# Patient Record
Sex: FEMALE | Race: WHITE | Employment: PART TIME | URBAN - METROPOLITAN AREA
[De-identification: names, ages, dates, MRNs, and addresses within clinical notes are randomized per-mention and may not be internally consistent; named-entity substitution may affect disease eponyms.]

---

## 2019-02-26 ASSESSMENT — ENCOUNTER SYMPTOMS
COUGH: 0
DIARRHEA: 0
MYALGIAS: 0
JOINT SWELLING: 0
HEADACHES: 0
NERVOUS/ANXIOUS: 0
CHILLS: 0
SHORTNESS OF BREATH: 0
DYSURIA: 0
ARTHRALGIAS: 0
NAUSEA: 0
PALPITATIONS: 0
CONSTIPATION: 0
FEVER: 0
PARESTHESIAS: 0
SORE THROAT: 0
WEAKNESS: 0
HEARTBURN: 0
HEMATOCHEZIA: 0
BREAST MASS: 0
DIZZINESS: 0
HEMATURIA: 0
EYE PAIN: 0
FREQUENCY: 0
ABDOMINAL PAIN: 0

## 2019-02-28 ENCOUNTER — OFFICE VISIT (OUTPATIENT)
Dept: PEDIATRICS | Facility: CLINIC | Age: 24
End: 2019-02-28
Payer: COMMERCIAL

## 2019-02-28 VITALS
BODY MASS INDEX: 20.62 KG/M2 | WEIGHT: 128.3 LBS | TEMPERATURE: 98 F | OXYGEN SATURATION: 99 % | SYSTOLIC BLOOD PRESSURE: 104 MMHG | DIASTOLIC BLOOD PRESSURE: 64 MMHG | HEIGHT: 66 IN | HEART RATE: 82 BPM

## 2019-02-28 DIAGNOSIS — R00.2 PALPITATIONS: ICD-10-CM

## 2019-02-28 DIAGNOSIS — L70.9 ACNE, UNSPECIFIED ACNE TYPE: Primary | ICD-10-CM

## 2019-02-28 DIAGNOSIS — N92.6 IRREGULAR MENSES: ICD-10-CM

## 2019-02-28 DIAGNOSIS — Z00.00 HEALTH CARE MAINTENANCE: ICD-10-CM

## 2019-02-28 DIAGNOSIS — J31.0 CHRONIC RHINITIS: ICD-10-CM

## 2019-02-28 DIAGNOSIS — Z11.3 ROUTINE SCREENING FOR STI (SEXUALLY TRANSMITTED INFECTION): ICD-10-CM

## 2019-02-28 PROCEDURE — 87491 CHLMYD TRACH DNA AMP PROBE: CPT | Performed by: NURSE PRACTITIONER

## 2019-02-28 PROCEDURE — G0145 SCR C/V CYTO,THINLAYER,RESCR: HCPCS | Performed by: NURSE PRACTITIONER

## 2019-02-28 PROCEDURE — 99385 PREV VISIT NEW AGE 18-39: CPT | Performed by: NURSE PRACTITIONER

## 2019-02-28 PROCEDURE — 99213 OFFICE O/P EST LOW 20 MIN: CPT | Mod: 25 | Performed by: NURSE PRACTITIONER

## 2019-02-28 RX ORDER — NORGESTIMATE AND ETHINYL ESTRADIOL 0.25-0.035
1 KIT ORAL DAILY
Qty: 84 TABLET | Refills: 3 | Status: SHIPPED | OUTPATIENT
Start: 2019-02-28 | End: 2020-01-15

## 2019-02-28 ASSESSMENT — ENCOUNTER SYMPTOMS
WEAKNESS: 0
HEADACHES: 0
MYALGIAS: 0
PALPITATIONS: 0
FEVER: 0
CONSTIPATION: 0
NAUSEA: 0
HEMATOCHEZIA: 0
DIZZINESS: 0
SHORTNESS OF BREATH: 0
BREAST MASS: 0
HEMATURIA: 0
SORE THROAT: 0
ARTHRALGIAS: 0
CHILLS: 0
JOINT SWELLING: 0
NERVOUS/ANXIOUS: 0
DIARRHEA: 0
EYE PAIN: 0
DYSURIA: 0
FREQUENCY: 0
PARESTHESIAS: 0
COUGH: 0
HEARTBURN: 0
ABDOMINAL PAIN: 0

## 2019-02-28 ASSESSMENT — MIFFLIN-ST. JEOR: SCORE: 1352.68

## 2019-02-28 NOTE — PATIENT INSTRUCTIONS
1. Start sprintec birth control on Sunday  2. Call me if you want to do a heart monitor        Preventive Health Recommendations  Female Ages 21 to 25     Yearly exam:     See your health care provider every year in order to  o Review health changes.   o Discuss preventive care.    o Review your medicines if your doctor has prescribed any.      You should be tested each year for STDs (sexually transmitted diseases).       Talk to your provider about how often you should have cholesterol testing.      Get a Pap test every three years. If you have an abnormal result, your doctor may have you test more often.      If you are at risk for diabetes, you should have a diabetes test (fasting glucose).     Shots:     Get a flu shot each year.     Get a tetanus shot every 10 years.     Consider getting the shot (vaccine) that prevents cervical cancer (Gardasil).    Nutrition:     Eat at least 5 servings of fruits and vegetables each day.    Eat whole-grain bread, whole-wheat pasta and brown rice instead of white grains and rice.    Get adequate Calcium and Vitamin D.     Lifestyle    Exercise at least 150 minutes a week each week (30 minutes a day, 5 days a week). This will help you control your weight and prevent disease.    Limit alcohol to one drink per day.    No smoking.     Wear sunscreen to prevent skin cancer.    See your dentist every six months for an exam and cleaning.

## 2019-02-28 NOTE — PROGRESS NOTES
SUBJECTIVE:   CC: Christine Perez is an 23 year old woman who presents for preventive health visit.     Physical   Annual:     Getting at least 3 servings of Calcium per day:  Yes    Bi-annual eye exam:  Yes    Dental care twice a year:  NO    Sleep apnea or symptoms of sleep apnea:  None    Diet:  Regular (no restrictions)    Frequency of exercise:  1 day/week    Duration of exercise:  15-30 minutes    Taking medications regularly:  Yes    Medication side effects:  None    Additional concerns today:  Yes    PHQ-2 Total Score: 0    Concerns today: irregular menses. Vary in length between periods but cycle is always the same. Has heavy cramps. Flow is moderate    Wants to discuss birth control  Wants to discuss STD testing  PVCs    Seasonal allergies. Has had a mildly runny nose since October. Hasn't tried claritin.     Acne is worse around her menses. Also has back acne.        -------------------------------------    Today's PHQ-2 Score:   PHQ-2 ( 1999 Pfizer) 2/26/2019   Q1: Little interest or pleasure in doing things 0   Q2: Feeling down, depressed or hopeless 0   PHQ-2 Score 0   Q1: Little interest or pleasure in doing things Not at all   Q2: Feeling down, depressed or hopeless Not at all   PHQ-2 Score 0     Abuse: Current or Past(Physical, Sexual or Emotional)- No  Do you feel safe in your environment? Yes    Social History     Tobacco Use     Smoking status: Never Smoker     Smokeless tobacco: Never Used   Substance Use Topics     Alcohol use: Yes     Comment: 2 rimes per week     Alcohol Use 2/26/2019   If you drink alcohol do you typically have greater than 3 drinks per day OR greater than 7 drinks per week? No   No flowsheet data found.    Reviewed orders with patient.  Reviewed health maintenance and updated orders accordingly - Yes  Labs reviewed in EPIC    Mammogram not appropriate for this patient based on age.    Pertinent mammograms are reviewed under the imaging tab.  History of abnormal Pap  "smear: NO - age 21-29 PAP every 3 years recommended     Reviewed and updated as needed this visit by clinical staff  Tobacco  Allergies  Meds  Med Hx  Surg Hx  Fam Hx  Soc Hx        Reviewed and updated as needed this visit by Provider            Review of Systems   Constitutional: Negative for chills and fever.   HENT: Positive for congestion. Negative for ear pain, hearing loss and sore throat.    Eyes: Negative for pain and visual disturbance.   Respiratory: Negative for cough and shortness of breath.    Cardiovascular: Negative for chest pain, palpitations and peripheral edema.   Gastrointestinal: Negative for abdominal pain, constipation, diarrhea, heartburn, hematochezia and nausea.   Breasts:  Negative for tenderness, breast mass and discharge.   Genitourinary: Negative for dysuria, frequency, genital sores, hematuria, pelvic pain, urgency, vaginal bleeding and vaginal discharge.   Musculoskeletal: Negative for arthralgias, joint swelling and myalgias.   Skin: Positive for rash.   Neurological: Negative for dizziness, weakness, headaches and paresthesias.   Psychiatric/Behavioral: Negative for mood changes. The patient is not nervous/anxious.         OBJECTIVE:   /64 (BP Location: Right arm, Cuff Size: Adult Regular)   Pulse 82   Temp 98  F (36.7  C) (Tympanic)   Ht 1.683 m (5' 6.25\")   Wt 58.2 kg (128 lb 4.8 oz)   LMP 01/28/2019 (Exact Date)   SpO2 99%   BMI 20.55 kg/m    Physical Exam  GENERAL: healthy, alert and no distress  EYES: Eyes grossly normal to inspection, PERRL and conjunctivae and sclerae normal  HENT: ear canals and TM's normal, nose and mouth without ulcers or lesions  NECK: no adenopathy, no asymmetry, masses, or scars and thyroid normal to palpation  RESP: lungs clear to auscultation - no rales, rhonchi or wheezes  CV: regular rate and rhythm, normal S1 S2, no S3 or S4, no murmur, click or rub, no peripheral edema and peripheral pulses strong  ABDOMEN: soft, nontender, no " hepatosplenomegaly, no masses and bowel sounds normal   (female): normal female external genitalia, normal urethral meatus, vaginal mucosa pink, moist, well rugated, and normal cervix/adnexa/uterus without masses or discharge  MS: no gross musculoskeletal defects noted, no edema  SKIN: no suspicious lesions or rashes  NEURO: Normal strength and tone, mentation intact and speech normal  PSYCH: mentation appears normal, affect normal/bright    Diagnostic Test Results:  none     ASSESSMENT/PLAN:   3. Health care maintenance  - PAP imaged thin layer screen only - recommended age 21 - 24 years    1. Acne, unspecified acne type  Cyclical. Will likely benefit from OCP. No contraindications.   -Also has derm appt.   - norgestimate-ethinyl estradiol (ORTHO-CYCLEN/SPRINTEC) 0.25-35 MG-MCG tablet; Take 1 tablet by mouth daily  Dispense: 84 tablet; Refill: 3    2. Irregular menses  No contraindications. Wishing to regulate cycle.   - norgestimate-ethinyl estradiol (ORTHO-CYCLEN/SPRINTEC) 0.25-35 MG-MCG tablet; Take 1 tablet by mouth daily  Dispense: 84 tablet; Refill: 3  -Discussed r/b/se    4. Routine screening for STI (sexually transmitted infection)  - CHLAMYDIA TRACHOMATIS PCR    5. Chronic rhinitis  Ongoing since October. No sinusitis sx necessarily. Likely allergic rhinitis  -Recommended trial of Claritin daily. Return PRN.    (R00.2) Palpitations  Comment: Patient reports about once every 2 weeks having episodes of palpitations. Sometimes she has a singular palpitation, sometimes they last a few minutes and can be associated with vision changes but not with shortness of breath, weakness, or presyncope  Plan:   -Discussed various etiologies, some of which are life threatening. She wishes to hold on holter monitor. Will call me if she changes her mind. She is also to call me if increasing in frequency or severity of associated sx. Discussed reasons to seek care urgently.         COUNSELING:  Reviewed preventive health  "counseling, as reflected in patient instructions    BP Readings from Last 1 Encounters:   02/28/19 104/64     Estimated body mass index is 20.55 kg/m  as calculated from the following:    Height as of this encounter: 1.683 m (5' 6.25\").    Weight as of this encounter: 58.2 kg (128 lb 4.8 oz).           reports that  has never smoked. she has never used smokeless tobacco.      Counseling Resources:  ATP IV Guidelines  Pooled Cohorts Equation Calculator  Breast Cancer Risk Calculator  FRAX Risk Assessment  ICSI Preventive Guidelines  Dietary Guidelines for Americans, 2010  USDA's MyPlate  ASA Prophylaxis  Lung CA Screening    Peyton Baxter, DAVONTE [facility] [place]  "

## 2019-03-01 LAB
C TRACH DNA SPEC QL NAA+PROBE: NEGATIVE
SPECIMEN SOURCE: NORMAL

## 2019-03-04 LAB
COPATH REPORT: NORMAL
PAP: NORMAL

## 2019-08-08 ENCOUNTER — MYC REFILL (OUTPATIENT)
Dept: PEDIATRICS | Facility: CLINIC | Age: 24
End: 2019-08-08

## 2019-08-08 DIAGNOSIS — N92.6 IRREGULAR MENSES: ICD-10-CM

## 2019-08-08 DIAGNOSIS — L70.9 ACNE, UNSPECIFIED ACNE TYPE: ICD-10-CM

## 2019-08-08 NOTE — TELEPHONE ENCOUNTER
"Requested Prescriptions   Pending Prescriptions Disp Refills     norgestimate-ethinyl estradiol (ORTHO-CYCLEN/SPRINTEC) 0.25-35 MG-MCG tablet  Last Written Prescription Date:  02/28/2019  Last Fill Quantity: 84 tablet,  # refills: 3   Last Office Visit: 2/28/2019 Peyton Baxter APRN CNP  Future Office Visit:      84 tablet 3     Sig: Take 1 tablet by mouth daily       Contraceptives Protocol Passed - 8/8/2019  3:35 PM        Passed - Patient is not a current smoker if age is 35 or older        Passed - Recent (12 mo) or future (30 days) visit within the authorizing provider's specialty     Patient had office visit in the last 12 months or has a visit in the next 30 days with authorizing provider or within the authorizing provider's specialty.  See \"Patient Info\" tab in inbasket, or \"Choose Columns\" in Meds & Orders section of the refill encounter.              Passed - Medication is active on med list        Passed - No active pregnancy on record        Passed - No positive pregnancy test in past 12 months          "

## 2019-08-09 RX ORDER — NORGESTIMATE AND ETHINYL ESTRADIOL 0.25-0.035
1 KIT ORAL DAILY
Qty: 84 TABLET | Refills: 3 | OUTPATIENT
Start: 2019-08-09

## 2019-09-01 ENCOUNTER — MYC REFILL (OUTPATIENT)
Dept: PEDIATRICS | Facility: CLINIC | Age: 24
End: 2019-09-01

## 2019-09-01 DIAGNOSIS — L70.9 ACNE, UNSPECIFIED ACNE TYPE: ICD-10-CM

## 2019-09-01 DIAGNOSIS — N92.6 IRREGULAR MENSES: ICD-10-CM

## 2019-09-01 RX ORDER — NORGESTIMATE AND ETHINYL ESTRADIOL 0.25-0.035
1 KIT ORAL DAILY
Qty: 84 TABLET | Refills: 3 | Status: CANCELLED | OUTPATIENT
Start: 2019-09-01

## 2019-09-01 NOTE — TELEPHONE ENCOUNTER
"Requested Prescriptions   Pending Prescriptions Disp Refills     norgestimate-ethinyl estradiol (ORTHO-CYCLEN/SPRINTEC) 0.25-35 MG-MCG tablet 84 tablet 3     Sig: Take 1 tablet by mouth daily  Last Written Prescription Date:  02/28/2019  Last Fill Quantity: 84 tablet,  # refills: 3   Last office visit: 2/28/2019 with prescribing provider:  Peyton Baxter APRN CNP    Future Office Visit:           Contraceptives Protocol Passed - 9/1/2019  8:09 AM        Passed - Patient is not a current smoker if age is 35 or older        Passed - Recent (12 mo) or future (30 days) visit within the authorizing provider's specialty     Patient had office visit in the last 12 months or has a visit in the next 30 days with authorizing provider or within the authorizing provider's specialty.  See \"Patient Info\" tab in inbasket, or \"Choose Columns\" in Meds & Orders section of the refill encounter.              Passed - Medication is active on med list        Passed - No active pregnancy on record        Passed - No positive pregnancy test in past 12 months          "

## 2019-09-03 ENCOUNTER — MYC REFILL (OUTPATIENT)
Dept: PEDIATRICS | Facility: CLINIC | Age: 24
End: 2019-09-03

## 2019-09-03 DIAGNOSIS — L70.9 ACNE, UNSPECIFIED ACNE TYPE: ICD-10-CM

## 2019-09-03 DIAGNOSIS — N92.6 IRREGULAR MENSES: ICD-10-CM

## 2019-09-04 RX ORDER — NORGESTIMATE AND ETHINYL ESTRADIOL 0.25-0.035
1 KIT ORAL DAILY
Qty: 84 TABLET | Refills: 3 | OUTPATIENT
Start: 2019-09-04

## 2019-09-04 NOTE — TELEPHONE ENCOUNTER
"Requested Prescriptions   Pending Prescriptions Disp Refills     norgestimate-ethinyl estradiol (ORTHO-CYCLEN/SPRINTEC) 0.25-35 MG-MCG tablet 84 tablet 3     Sig: Take 1 tablet by mouth daily       Contraceptives Protocol Passed - 9/3/2019  9:58 AM        Passed - Patient is not a current smoker if age is 35 or older        Passed - Recent (12 mo) or future (30 days) visit within the authorizing provider's specialty     Patient had office visit in the last 12 months or has a visit in the next 30 days with authorizing provider or within the authorizing provider's specialty.  See \"Patient Info\" tab in inbasket, or \"Choose Columns\" in Meds & Orders section of the refill encounter.              Passed - Medication is active on med list        Passed - No active pregnancy on record        Passed - No positive pregnancy test in past 12 months        RX refused, patient has refills at pharmacy.  "

## 2019-10-10 ENCOUNTER — TELEPHONE (OUTPATIENT)
Dept: PEDIATRICS | Facility: CLINIC | Age: 24
End: 2019-10-10

## 2019-10-10 NOTE — TELEPHONE ENCOUNTER
Reason for call:  Symptom   Symptom or request: Uti sx's    Duration (how long have symptoms been present): x3days  Have you been treated for this before? Yes    Additional comments: Pt req rx be sent to pharmacy, she is having UTI sx's-Itchiness, Frequent urination, pain/burning while urinating, Blood at the end of the urine stream. 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 Req be sent to [place] [address] [address] #-[phone],   Pt mentioned she has taken Bactrim before for UTI's and has worked great so prefers to be prescribed Bactrim.    Phone number to reach patient:  Cell number on file:    Telephone Information:   Mobile [phone]       Best Time:  Any    Can we leave a detailed message on this number?  YES

## 2019-10-11 NOTE — TELEPHONE ENCOUNTER
Spoke to patient and explained we need to set her up with either a same day appt or she will need to be seen in urgent care. Patient is going to see if she would be able to come in today and is going to call us back.    MA/TC: Please assist patient on scheduling a Same Day appointment when she calls back or advise her to go to urgent care. Thanks!  Jillian PALACIOS RN, BSN

## 2019-10-14 NOTE — TELEPHONE ENCOUNTER
Called patient for update. Patient reports she never did make an appointment or go in for evaluation but reports her symptoms have subsided so she no longer needs an appointment.     Advised if her symptoms return she can submit an evisit on [employer] for UTI symptoms as well. Patient verbalized understanding.

## 2019-11-13 ENCOUNTER — TELEPHONE (OUTPATIENT)
Dept: PEDIATRICS | Facility: CLINIC | Age: 24
End: 2019-11-13

## 2019-11-13 DIAGNOSIS — Z00.00 HEALTH CARE MAINTENANCE: Primary | ICD-10-CM

## 2019-11-13 NOTE — TELEPHONE ENCOUNTER
Patient is requesting titers for Nursing school. She needs Hepatitis C, Varicella and MMR titers drawn. Pended labs. Please review/sign if appropriate.     Please transfer to MA/TC to help schedule lab only appointment once approved.  Thanks!  Jillian PALACIOS RN, BSN

## 2019-11-13 NOTE — TELEPHONE ENCOUNTER
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 Thank you.     Phone Number Patient can be reached at: Home number on file [phone] (home)    Best Time: Anytime    Can we leave a detailed message on this number? YES    Call taken on 11/13/2019 at 10:55 AM by Kathy Boo

## 2019-11-18 ENCOUNTER — MYC REFILL (OUTPATIENT)
Dept: PEDIATRICS | Facility: CLINIC | Age: 24
End: 2019-11-18

## 2019-11-18 DIAGNOSIS — L70.9 ACNE, UNSPECIFIED ACNE TYPE: ICD-10-CM

## 2019-11-18 DIAGNOSIS — N92.6 IRREGULAR MENSES: ICD-10-CM

## 2019-11-18 RX ORDER — NORGESTIMATE AND ETHINYL ESTRADIOL 0.25-0.035
1 KIT ORAL DAILY
Qty: 84 TABLET | Refills: 3 | Status: CANCELLED | OUTPATIENT
Start: 2019-11-18

## 2019-11-18 NOTE — TELEPHONE ENCOUNTER
"Requested Prescriptions   Pending Prescriptions Disp Refills     norgestimate-ethinyl estradiol (ORTHO-CYCLEN/SPRINTEC) 0.25-35 MG-MCG tablet  Last Written Prescription Date:  02/28/2019  Last Fill Quantity: 84 tablet,  # refills: 3   Last Office Visit: 2/28/2019 Peyton Baxter APRN CNP   Future Office Visit:      84 tablet 3     Sig: Take 1 tablet by mouth daily       Contraceptives Protocol Passed - 11/18/2019  2:44 PM        Passed - Patient is not a current smoker if age is 35 or older        Passed - Recent (12 mo) or future (30 days) visit within the authorizing provider's specialty     Patient has had an office visit with the authorizing provider or a provider within the authorizing providers department within the previous 12 mos or has a future within next 30 days. See \"Patient Info\" tab in inbasket, or \"Choose Columns\" in Meds & Orders section of the refill encounter.              Passed - Medication is active on med list        Passed - No active pregnancy on record        Passed - No positive pregnancy test in past 12 months          "

## 2019-12-06 NOTE — TELEPHONE ENCOUNTER
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    Please, call pt when orders are placed for lab scheduling.     Pt preferred phone number on file and it is OK to leave a detailed message.

## 2019-12-06 NOTE — TELEPHONE ENCOUNTER
Called patient- no answer. Lvm to call clinic to schedule an appointment.     Roseann Burrows MA

## 2019-12-12 DIAGNOSIS — Z00.00 HEALTH CARE MAINTENANCE: ICD-10-CM

## 2019-12-12 LAB — HCV AB SERPL QL IA: NONREACTIVE

## 2019-12-12 PROCEDURE — 86762 RUBELLA ANTIBODY: CPT | Performed by: NURSE PRACTITIONER

## 2019-12-12 PROCEDURE — 86735 MUMPS ANTIBODY: CPT | Performed by: NURSE PRACTITIONER

## 2019-12-12 PROCEDURE — 86765 RUBEOLA ANTIBODY: CPT | Performed by: NURSE PRACTITIONER

## 2019-12-12 PROCEDURE — 36415 COLL VENOUS BLD VENIPUNCTURE: CPT | Performed by: NURSE PRACTITIONER

## 2019-12-12 PROCEDURE — 86787 VARICELLA-ZOSTER ANTIBODY: CPT | Performed by: NURSE PRACTITIONER

## 2019-12-13 LAB
MEV IGG SER QL IA: 0.8 AI (ref 0–0.8)
MUV IGG SER QL IA: 1.3 AI (ref 0–0.8)
RUBV IGG SERPL IA-ACNC: 16 IU/ML
VZV IGG SER QL IA: 0.8 AI (ref 0–0.8)

## 2019-12-16 DIAGNOSIS — Z11.59 NEED FOR HEPATITIS B SCREENING TEST: ICD-10-CM

## 2019-12-16 PROCEDURE — 86704 HEP B CORE ANTIBODY TOTAL: CPT | Performed by: NURSE PRACTITIONER

## 2019-12-16 PROCEDURE — 36415 COLL VENOUS BLD VENIPUNCTURE: CPT | Performed by: NURSE PRACTITIONER

## 2019-12-16 PROCEDURE — 87340 HEPATITIS B SURFACE AG IA: CPT | Performed by: NURSE PRACTITIONER

## 2019-12-16 PROCEDURE — 86706 HEP B SURFACE ANTIBODY: CPT | Performed by: NURSE PRACTITIONER

## 2019-12-17 LAB
HBV CORE AB SERPL QL IA: NONREACTIVE
HBV SURFACE AB SERPL IA-ACNC: NORMAL M[IU]/ML
HBV SURFACE AG SERPL QL IA: NONREACTIVE

## 2020-01-15 ENCOUNTER — MYC REFILL (OUTPATIENT)
Dept: PEDIATRICS | Facility: CLINIC | Age: 25
End: 2020-01-15

## 2020-01-15 DIAGNOSIS — N92.6 IRREGULAR MENSES: ICD-10-CM

## 2020-01-15 DIAGNOSIS — L70.9 ACNE, UNSPECIFIED ACNE TYPE: ICD-10-CM

## 2020-01-16 RX ORDER — NORGESTIMATE AND ETHINYL ESTRADIOL 0.25-0.035
1 KIT ORAL DAILY
Qty: 84 TABLET | Refills: 0 | Status: SHIPPED | OUTPATIENT
Start: 2020-01-16 | End: 2020-04-12

## 2020-01-16 NOTE — TELEPHONE ENCOUNTER
"Prescription approved per [facility], [facility] or [facility] refill protocol.  Edilia ISRAEL - Registered Nurse  [facility]  Acute and Diagnostic Services        Requested Prescriptions   Pending Prescriptions Disp Refills     norgestimate-ethinyl estradiol (ORTHO-CYCLEN/SPRINTEC) 0.25-35 MG-MCG tablet 84 tablet 3     Sig: Take 1 tablet by mouth daily       Contraceptives Protocol Passed - 1/15/2020 11:32 AM        Passed - Patient is not a current smoker if age is 35 or older        Passed - Recent (12 mo) or future (30 days) visit within the authorizing provider's specialty     Patient has had an office visit with the authorizing provider or a provider within the authorizing providers department within the previous 12 mos or has a future within next 30 days. See \"Patient Info\" tab in inbasket, or \"Choose Columns\" in Meds & Orders section of the refill encounter.              Passed - Medication is active on med list        Passed - No active pregnancy on record        Passed - No positive pregnancy test in past 12 months          "

## 2020-04-12 ENCOUNTER — MYC REFILL (OUTPATIENT)
Dept: PEDIATRICS | Facility: CLINIC | Age: 25
End: 2020-04-12

## 2020-04-12 DIAGNOSIS — L70.9 ACNE, UNSPECIFIED ACNE TYPE: ICD-10-CM

## 2020-04-12 DIAGNOSIS — N92.6 IRREGULAR MENSES: ICD-10-CM

## 2020-04-13 RX ORDER — NORGESTIMATE AND ETHINYL ESTRADIOL 0.25-0.035
1 KIT ORAL DAILY
Qty: 84 TABLET | Refills: 0 | Status: SHIPPED | OUTPATIENT
Start: 2020-04-13 | End: 2020-06-18

## 2020-04-13 NOTE — TELEPHONE ENCOUNTER
LOV was on 2/28/19. Please review the notes from TC below & advise.    Irene, RN  Triage Nurse

## 2020-04-13 NOTE — TELEPHONE ENCOUNTER
"norgestimate-ethinyl estradiol (ORTHO-CYCLEN) 0.25-35 MG-MCG tablet      Last Written Prescription Date:  01/16/20  Last Fill Quantity: 84 tabs,   # refills: 0  Last Office Visit: 02/28/19  Future Office visit:    Next 5 appointments (look out 90 days)    Jun 18, 2020  3:35 PM CDT  (Arrive by 3:10 PM)  Adult [facility]ative Visit with DAV[facility] C[facility] [address] [place] [id number] [phone]           Routing refill request to provider for review/approval because:  Failed - Recent (12 mo) or future (30 days) visit within the authorizing provider's specialty        - Reilly \"Magdiel\" Blake RN - Patient Advocate Liason (PAL)  [facility]    "

## 2020-04-13 NOTE — TELEPHONE ENCOUNTER
Hi there,    Please let her know that there is a charge for all office visits other than a physical as long as there aren't any issues. Would she be willing to come in for a physical? If so, we can schedule her for August and fill it until then. If not, then I cannot fill.

## 2020-04-13 NOTE — TELEPHONE ENCOUNTER
Called and LVM to contact clinic re: refill request.  Please schedule for physical in August and send back to provider with appt info for refill.  See below.  Wendie Buchanan, CMA

## 2020-04-13 NOTE — TELEPHONE ENCOUNTER
Patient called back she stated she will only do a video visit or my chart or telephone if there is no charge I explain to her that there are charges for all of those at this time, she said that she would rather go with out her birthcontrol then. Please review this and see what the provider wants to do.

## 2020-06-15 ASSESSMENT — ENCOUNTER SYMPTOMS
EYE PAIN: 0
HEMATOCHEZIA: 0
DIARRHEA: 0
WEAKNESS: 0
SHORTNESS OF BREATH: 0
CHILLS: 0
FREQUENCY: 0
DYSURIA: 0
HEMATURIA: 0
DIZZINESS: 0
BREAST MASS: 0
HEADACHES: 0
PALPITATIONS: 0
FEVER: 0
PARESTHESIAS: 0
HEARTBURN: 0
COUGH: 0
ARTHRALGIAS: 0
JOINT SWELLING: 0
NERVOUS/ANXIOUS: 0
NAUSEA: 0
SORE THROAT: 0
ABDOMINAL PAIN: 0
CONSTIPATION: 0
MYALGIAS: 0

## 2020-06-18 ENCOUNTER — VIRTUAL VISIT (OUTPATIENT)
Dept: PEDIATRICS | Facility: CLINIC | Age: 25
End: 2020-06-18
Payer: COMMERCIAL

## 2020-06-18 DIAGNOSIS — N92.6 IRREGULAR MENSES: ICD-10-CM

## 2020-06-18 DIAGNOSIS — L70.9 ACNE, UNSPECIFIED ACNE TYPE: ICD-10-CM

## 2020-06-18 DIAGNOSIS — Z86.69 HISTORY OF CHIARI MALFORMATION: ICD-10-CM

## 2020-06-18 PROCEDURE — 99213 OFFICE O/P EST LOW 20 MIN: CPT | Mod: 95 | Performed by: NURSE PRACTITIONER

## 2020-06-18 RX ORDER — NORGESTIMATE AND ETHINYL ESTRADIOL 0.25-0.035
1 KIT ORAL DAILY
Qty: 84 TABLET | Refills: 4 | Status: SHIPPED | OUTPATIENT
Start: 2020-06-18 | End: 2021-07-15

## 2020-06-18 NOTE — PROGRESS NOTES
"Christine Perez is a 24 year old female who is being evaluated via a billable video visit.      The patient has been notified of following:     \"This video visit will be conducted via a call between you and your physician/provider. We have found that certain health care needs can be provided without the need for an in-person physical exam.  This service lets us provide the care you need with a video conversation.  If a prescription is necessary we can send it directly to your pharmacy.  If lab work is needed we can place an order for that and you can then stop by our lab to have the test done at a later time.    Video visits are billed at different rates depending on your insurance coverage.  Please reach out to your insurance provider with any questions.    If during the course of the call the physician/provider feels a video visit is not appropriate, you will not be charged for this service.\"    Patient has given verbal consent for Video visit? Yes    Will anyone else be joining your video visit? No    Subjective     Christine Perez is a 24 year old female who presents today via video visit for the following health issues:    HPI  Answers for HPI/ROS submitted by the patient on 6/15/2020   Annual Exam:  Frequency of exercise:: 1 day/week  Getting at least 3 servings of Calcium per day:: NO  Diet:: Regular (no restrictions)  Taking medications regularly:: Yes  Medication side effects:: None  Bi-annual eye exam:: Yes  Dental care twice a year:: NO  Sleep apnea or symptoms of sleep apnea:: None  abdominal pain: No  Blood in stool: No  Blood in urine: No  chest pain: No  chills: No  congestion: No  constipation: No  cough: No  diarrhea: No  dizziness: No  ear pain: No  eye pain: No  nervous/anxious: No  fever: No  frequency: No  genital sores: No  headaches: No  hearing loss: No  heartburn: No  arthralgias: No  joint swelling: No  peripheral edema: No  mood changes: No  myalgias: No  nausea: No  dysuria: " No  palpitations: No  Skin sensation changes: No  sore throat: No  urgency: No  rash: No  shortness of breath: No  visual disturbance: No  weakness: No  pelvic pain: No  vaginal bleeding: No  vaginal discharge: No  tenderness: No  breast mass: No  breast discharge: No  Additional concerns today:: Yes  Duration of exercise:: 15-30 minutes    Video Start Time: 3:25 PM    PROBLEMS TO ADD ON...    Patient Active Problem List   Diagnosis     History of Chiari malformation     Past Surgical History:   Procedure Laterality Date     NO HISTORY OF SURGERY         Social History     Tobacco Use     Smoking status: Never Smoker     Smokeless tobacco: Never Used   Substance Use Topics     Alcohol use: Yes     Comment: 2 rimes per week     Family History   Problem Relation Age of Onset     Hypertension Father      Hyperlipidemia Father      Myocardial Infarction Paternal Grandfather         cause of death in after age 70     Breast Cancer Paternal Aunt            Reviewed and updated as needed this visit by Provider         Review of Systems   Constitutional, HEENT, cardiovascular, pulmonary, gi and gu systems are negative, except as otherwise noted.        Diagnostic Test Results:  Labs reviewed in Epic        Assessment & Plan     1. Acne, unspecified acne type  - norgestimate-ethinyl estradiol (ORTHO-CYCLEN) 0.25-35 MG-MCG tablet; Take 1 tablet by mouth daily  Dispense: 84 tablet; Refill: 4    2. Irregular menses  Well controlled. Rarely missing pills. No contraindications Discussed risks of missing pills. We did discuss other options such as the ring or the patch, which would reduce the amount of times she has to remember to take. If wanting to switch, she can let me know without having to have a visit any time in the next year.  - norgestimate-ethinyl estradiol (ORTHO-CYCLEN) 0.25-35 MG-MCG tablet; Take 1 tablet by mouth daily  Dispense: 84 tablet; Refill: 4    3. History of Chiari malformation  Noted on research study.  Lowest grade. Asymptomatic.       DAVONTE Prado CNP  [facility] [place]      Video-Visit Details    Type of service:  Video Visit    Video End Time:3:37 PM    Originating Location (pt. Location): Home    Distant Location (provider location):  [facility] [place]     Platform used for Video Visit: DAVONTE Amanda CNP

## 2020-11-25 ENCOUNTER — MYC MEDICAL ADVICE (OUTPATIENT)
Dept: PEDIATRICS | Facility: CLINIC | Age: 25
End: 2020-11-25

## 2020-11-25 DIAGNOSIS — Z11.59 NEED FOR HEPATITIS B SCREENING TEST: Primary | ICD-10-CM

## 2020-11-25 NOTE — TELEPHONE ENCOUNTER
Peyton,    Please see MC message from pt.  She needs an order for a hep b titer    Order pended- please review    Thank you  Sudeep Rey RN on 11/25/2020 at 2:47 PM

## 2020-12-02 ENCOUNTER — ALLIED HEALTH/NURSE VISIT (OUTPATIENT)
Dept: PEDIATRICS | Facility: CLINIC | Age: 25
End: 2020-12-02
Payer: COMMERCIAL

## 2020-12-02 DIAGNOSIS — Z23 ENCOUNTER FOR IMMUNIZATION: Primary | ICD-10-CM

## 2020-12-02 PROCEDURE — 86580 TB INTRADERMAL TEST: CPT

## 2020-12-02 PROCEDURE — 99207 PR NO CHARGE NURSE ONLY: CPT

## 2021-01-03 ENCOUNTER — HEALTH MAINTENANCE LETTER (OUTPATIENT)
Age: 26
End: 2021-01-03

## 2021-04-19 ENCOUNTER — MYC MEDICAL ADVICE (OUTPATIENT)
Dept: PEDIATRICS | Facility: CLINIC | Age: 26
End: 2021-04-19

## 2021-07-13 DIAGNOSIS — L70.9 ACNE, UNSPECIFIED ACNE TYPE: ICD-10-CM

## 2021-07-13 DIAGNOSIS — N92.6 IRREGULAR MENSES: ICD-10-CM

## 2021-07-15 RX ORDER — NORGESTIMATE AND ETHINYL ESTRADIOL 0.25-0.035
KIT ORAL
Qty: 84 TABLET | Refills: 0 | Status: SHIPPED | OUTPATIENT
Start: 2021-07-15

## 2021-07-15 NOTE — TELEPHONE ENCOUNTER
Medication is being filled for 1 time refill only due to:  Patient needs to be seen because it has been more than one year since last visit.  Routing to MA/TC pool. The Pt is due for a visit with PCP. Please call and help them schedule.    RN will issue a 90 day supply. No further refills until the Pt is seen.         Jacob KRISHNA RN

## 2021-10-10 ENCOUNTER — HEALTH MAINTENANCE LETTER (OUTPATIENT)
Age: 26
End: 2021-10-10

## 2022-01-29 ENCOUNTER — HEALTH MAINTENANCE LETTER (OUTPATIENT)
Age: 27
End: 2022-01-29

## 2022-09-18 ENCOUNTER — HEALTH MAINTENANCE LETTER (OUTPATIENT)
Age: 27
End: 2022-09-18

## 2023-12-17 ENCOUNTER — HEALTH MAINTENANCE LETTER (OUTPATIENT)
Age: 28
End: 2023-12-17